# Patient Record
Sex: FEMALE | Race: WHITE | NOT HISPANIC OR LATINO | URBAN - METROPOLITAN AREA
[De-identification: names, ages, dates, MRNs, and addresses within clinical notes are randomized per-mention and may not be internally consistent; named-entity substitution may affect disease eponyms.]

---

## 2023-10-28 DIAGNOSIS — K50.10 CROHN'S DISEASE OF COLON WITHOUT COMPLICATION (MULTI): Primary | ICD-10-CM

## 2023-10-30 RX ORDER — LOPERAMIDE HYDROCHLORIDE 2 MG/1
CAPSULE ORAL
Qty: 240 CAPSULE | Refills: 3 | Status: SHIPPED | OUTPATIENT
Start: 2023-10-30 | End: 2024-03-07

## 2024-03-07 DIAGNOSIS — K50.10 CROHN'S DISEASE OF COLON WITHOUT COMPLICATION (MULTI): ICD-10-CM

## 2024-03-07 RX ORDER — LOPERAMIDE HYDROCHLORIDE 2 MG/1
CAPSULE ORAL
Qty: 240 CAPSULE | Refills: 3 | Status: SHIPPED | OUTPATIENT
Start: 2024-03-07

## 2024-04-18 PROBLEM — C21.0 ANAL SQUAMOUS CELL CARCINOMA (MULTI): Status: ACTIVE | Noted: 2024-04-18

## 2024-04-18 PROBLEM — K50.90 CROHN'S DISEASE (MULTI): Status: ACTIVE | Noted: 2024-04-18

## 2024-04-18 RX ORDER — CONJUGATED ESTROGENS 0.62 MG/G
CREAM VAGINAL
COMMUNITY
Start: 2024-01-22

## 2024-04-18 RX ORDER — FLUOXETINE HYDROCHLORIDE 20 MG/1
40 CAPSULE ORAL DAILY
COMMUNITY

## 2024-04-18 RX ORDER — LANSOPRAZOLE 30 MG/1
CAPSULE, DELAYED RELEASE ORAL
COMMUNITY
Start: 2024-01-26

## 2024-04-18 RX ORDER — ALBUTEROL SULFATE 90 UG/1
2 AEROSOL, METERED RESPIRATORY (INHALATION) EVERY 4 HOURS PRN
COMMUNITY
Start: 2024-02-12

## 2024-04-18 RX ORDER — ESTROGENS, CONJUGATED 0.62 MG/1
0.62 TABLET, FILM COATED ORAL DAILY
COMMUNITY
Start: 2024-02-08

## 2024-04-18 RX ORDER — VALACYCLOVIR HYDROCHLORIDE 500 MG/1
500 TABLET, FILM COATED ORAL DAILY
COMMUNITY
Start: 2024-01-21

## 2024-04-30 NOTE — PROGRESS NOTES
Brittany Kay is a 54 year old female with history of Crohn's disease and squamous cell carcinoma of the anus. S/P chemo and XRT finished summer 2012.      Brittany presents to the office for a check of the anal area.  Is under the care of Dr. Ramos Nino at [facility]. Last visit with pap was January 2024.  Was normal per patient.     Feels well.   She has good and bad days with her bowels.  Bowel habit is 3-10 times per day.  Stool consistency ranges from watery to semi firm.  She c/o seepage related to urgency.   Will have fecal incontinence when she has urgency and is not near a restroom.  She does use imodium and lomotil as needed.  No pain around the anus. No new lumps or bumps.  Weight has been going up.   She is gaining weight.      She found that the humira wasn't working very well.  She is able to control things well on pentasa and lomotil.          11/2023 Colonoscopy to ileocolic anastomosis   The perianal and digital rectal examinations were normal.   Patchy changes of vascular-pattern-decreased mucosa and mild luminal narrowing were found in the rectum. Biopsies were taken with a cold forceps for histology including from 20 cm where there was mild  luminal narrowing. We could not retroflex due to a stiff feeling   distal rectum.   A localized area of mildly vascular-pattern-decreased mucosa was found in the descending colon.   A diminutive polyp was found in the transverse colon. The polyp was sessile. The polyp was removed with a cold biopsy forceps. Resection and retrieval were complete.   There was evidence of a prior end-to-end ileo-colonic anastomosis in the distal ascending colon. This was patent and was characterized by healthy appearing mucosa. The anastomosis was traversed. The cristopher-TI was intubated for a distance of 10 cm and appeared normal   with no inflammation- this was graded as Rutgeerts Score i0 (no  lesions in the distal ileum).   Virtual chromoendoscopy using NBI was performed  throughout the colon-  no additional lesions were seen.  Biopsies were taken with a cold forceps in the entire colon for  histology.   Pathology  A. Colon, transverse, polypectomy:   - Minute focus of polypoid low-grade dysplasia.  - Adjacent colonic mucosa with no significant diagnostic alterations.   See comment.        B. Colon, transverse, biopsy:   - Colonic mucosa with no significant diagnostic alterations.   - No active disease present.   - Negative for dysplasia.      C. Colon, descending, biopsies:   - Colonic mucosa with no significant diagnostic alterations.   - No active disease present.   - Negative for dysplasia.      D. Colon at 20 cm, biopsy:   - Chronic quiescent colitis.    - Negative for dysplasia.      E. Colon, distal rectum, biopsy:  - Chronic quiescent colitis.    - Negative for dysplasia.         Visit Vitals  /75   Pulse 94   Temp 35.8 °C (96.4 °F)   Wt 61.2 kg (135 lb)   SpO2 99%   BMI 24.69 kg/m²   Smoking Status Never   BSA 1.64 m²       Review of Systems  Constitutional: Negative for fever, chills, anorexia, weight loss, malaise                  ENMT: Negative for nasal discharge, congestion, ear pain, mouth pain, throat pain             Respiratory: Negative for cough, hemoptysis, wheezing, shortness of breath         Cardiac: Negative for chest pain, dyspnea on exertion, orthopnea, palpitations, syncope                   Gastrointestinal: Negative for nausea, vomiting, diarrhea, constipation, abdominal pain, (+)CROHN'S DISEASE, (+)ANAL SQUAMOUS CELL CARCINOMA (+) INCONTINENCE         Genitourinary: Negative for discharge, dysuria, flank pain, frequency, hematuria                  Musculoskeletal: Negative for decreased ROM, pain, swelling, weakness              Neurological: Negative for dizziness, confusion, headache, seizures, syncope       Psychiatric: Negative for mood changes, anxiety, hallucinations, sleep changes, suicidal ideas         Skin: Negative for mass, pain,  itching, rash, ulcer (+) ANAL IRRITATION               Endocrine: Negative for heat intolerance, cold intolerance, excessive sweating, polyuria, excess thirst            Hematologic/Lymph: Negative for anemia, bruising, easy bleeding, night sweats, petechiae, history of DVT/PE or cancer              Allergic/Immunologic: Negative for anaphylaxis, itchy/ teary eyes, itching, sneezing, swelling           Physical Exam  Constitutional: Well developed, awake/alert/oriented x3, no distress, alert and cooperative             Eyes: Sclera anicteric, no conjunctival inflammation, conjugate gaze    ENMT: mucous membranes moist, no apparent injury,            Head/Neck: Neck supple, no apparent injury, No JVD, trachea midline, no bruits              Respiratory/Thorax: Patent airways, CTAB, normal breath sounds with good chest expansion, thorax symmetric         Cardiovascular: Regular, rate and rhythm, no murmurs, normal S1 and S2         Gastrointestinal: Nondistended, soft, non-tender, no rebound tenderness or guarding, no masses palpable, no organomegaly, +BS, no bruits               Extremities: normal extremities, no cyanosis edema, contusions or wounds, 2+ femoral pulses B/L              Neurological: alert and oriented x3, normal strength, Normal gait          Lymphatic: No palpable inguinal lymphadenopathy   Psychological: Appropriate mood and behavior         Skin: Warm and dry, no lesions, no rashes                Anorectal: 3mm lesion int he right posterior position, and a 1x2 mm lesion in the left just next to the gluteal cleft - both removed see below    Anoscopy    Date/Time: 5/22/2024 10:58 AM    Performed by: Berenice Mason MD  Authorized by: Berenice Mason MD    Consent:     Consent obtained:  Verbal    Consent given by:  Patient    Risks, benefits, and alternatives were discussed: yes      Risks discussed:  Pain    Alternatives discussed:  No treatment  Universal protocol:     Procedure explained  and questions answered to patient or proxy's satisfaction: yes      Relevant documents present and verified: yes      Test results available: yes      Imaging studies available: no      Site/side marked: yes      Immediately prior to procedure, a time out was called: yes      Patient identity confirmed:  Verbally with patient  Post-procedure details:     Procedure completion:  Tolerated  Comments:      Procedure Note: With a lubricated, gloved index finger, I gently performed a 360 degree sweep of the rectum checking for anal sphincter tone, tenderness, nodules, and masses. Following gentle dilation with a digital rectal exam, I inserted the lubricated anoscope with the obturator completely inserted. The obturator was removed after fully inserting the anoscope. The pediatric anoscope was slowly withdrawn, and the rectal and anal mucosa examined over 360 degrees.  There is a scar in the anterior position that is well healed and mild mucosal inflammation Crohn's vs RT    There were 2 lesions noted in the perianal skin that were prepped using betadine and injected with 1cc of lido with epi and removed using an 11 blade and sent for pathology and silver nitrate applied with no bleeding.      Impression:  TI and colonic Crohn's disease= doing well on pentasa  T4 anal SCC - Complete response to chemo/XRT  - [facility] For 12 years.    2 small skin lesions today    Plan:    F/U on pathology if this is AIN then will switch her to 6 month follow up  If not then will be yearly   Continue care with Dr. Posada if symptoms return urged her to retry biologics with her extensive disease.

## 2024-05-01 ENCOUNTER — APPOINTMENT (OUTPATIENT)
Dept: SURGERY | Facility: CLINIC | Age: 54
End: 2024-05-01
Payer: MEDICARE

## 2024-05-22 ENCOUNTER — OFFICE VISIT (OUTPATIENT)
Dept: SURGERY | Facility: CLINIC | Age: 54
End: 2024-05-22
Payer: MEDICARE

## 2024-05-22 VITALS
TEMPERATURE: 96.4 F | SYSTOLIC BLOOD PRESSURE: 108 MMHG | HEART RATE: 94 BPM | OXYGEN SATURATION: 99 % | BODY MASS INDEX: 24.69 KG/M2 | DIASTOLIC BLOOD PRESSURE: 75 MMHG | WEIGHT: 135 LBS

## 2024-05-22 DIAGNOSIS — C21.0 ANAL SQUAMOUS CELL CARCINOMA (MULTI): Primary | ICD-10-CM

## 2024-05-22 PROCEDURE — 88313 SPECIAL STAINS GROUP 2: CPT | Mod: TC,AHULAB | Performed by: COLON & RECTAL SURGERY

## 2024-05-22 PROCEDURE — 46600 DIAGNOSTIC ANOSCOPY SPX: CPT | Performed by: COLON & RECTAL SURGERY

## 2024-05-22 PROCEDURE — 99213 OFFICE O/P EST LOW 20 MIN: CPT | Performed by: COLON & RECTAL SURGERY

## 2024-05-22 PROCEDURE — 1036F TOBACCO NON-USER: CPT | Performed by: COLON & RECTAL SURGERY

## 2024-05-22 PROCEDURE — 88305 TISSUE EXAM BY PATHOLOGIST: CPT | Performed by: PATHOLOGY

## 2024-05-22 PROCEDURE — 88313 SPECIAL STAINS GROUP 2: CPT | Mod: TC | Performed by: PATHOLOGY

## 2024-05-22 RX ORDER — DOXYCYCLINE HYCLATE 20 MG
20 TABLET ORAL 2 TIMES DAILY
COMMUNITY

## 2024-05-22 RX ORDER — CLINDAMYCIN PHOSPHATE 11.9 MG/ML
SOLUTION TOPICAL 2 TIMES DAILY
COMMUNITY

## 2024-05-22 RX ORDER — SILVER NITRATE 38.21; 12.74 MG/1; MG/1
STICK TOPICAL ONCE
Status: CANCELLED | OUTPATIENT
Start: 2024-05-22 | End: 2024-05-22

## 2024-05-22 RX ORDER — TOPIRAMATE 200 MG/1
200 TABLET ORAL 3 TIMES DAILY
COMMUNITY

## 2024-05-22 RX ORDER — SUMATRIPTAN 50 MG/1
50 TABLET, FILM COATED ORAL ONCE AS NEEDED
COMMUNITY

## 2024-05-22 RX ORDER — TRETINOIN 0.5 MG/G
CREAM TOPICAL NIGHTLY
COMMUNITY

## 2024-05-22 RX ORDER — MONTELUKAST SODIUM 4 MG/1
1 TABLET, CHEWABLE ORAL DAILY
COMMUNITY

## 2024-05-22 RX ORDER — DIPHENOXYLATE HYDROCHLORIDE AND ATROPINE SULFATE 2.5; .025 MG/1; MG/1
1 TABLET ORAL 4 TIMES DAILY PRN
COMMUNITY
End: 2024-05-29 | Stop reason: SDUPTHER

## 2024-05-22 RX ORDER — MESALAMINE 0.38 G/1
1.5 CAPSULE, EXTENDED RELEASE ORAL DAILY
COMMUNITY

## 2024-05-22 RX ORDER — LEVETIRACETAM 500 MG/1
500 TABLET ORAL 3 TIMES DAILY
COMMUNITY

## 2024-05-22 RX ORDER — ATORVASTATIN CALCIUM 10 MG/1
10 TABLET, FILM COATED ORAL DAILY
COMMUNITY

## 2024-05-22 RX ORDER — CYCLOBENZAPRINE HCL 10 MG
10 TABLET ORAL 3 TIMES DAILY PRN
COMMUNITY

## 2024-05-22 RX ORDER — LIDOCAINE HYDROCHLORIDE AND EPINEPHRINE 10; 10 MG/ML; UG/ML
10 INJECTION, SOLUTION INFILTRATION; PERINEURAL ONCE
Status: CANCELLED | OUTPATIENT
Start: 2024-05-22 | End: 2024-05-22

## 2024-05-22 RX ORDER — MONTELUKAST SODIUM 10 MG/1
10 TABLET ORAL NIGHTLY
COMMUNITY

## 2024-05-22 ASSESSMENT — ENCOUNTER SYMPTOMS: DEPRESSION: 0

## 2024-05-22 ASSESSMENT — PAIN SCALES - GENERAL: PAINLEVEL: 2

## 2024-05-28 ENCOUNTER — PATIENT MESSAGE (OUTPATIENT)
Dept: SURGERY | Facility: CLINIC | Age: 54
End: 2024-05-28
Payer: MEDICARE

## 2024-05-28 NOTE — TELEPHONE ENCOUNTER
From: Brittany Kay  To: Berenice Mason  Sent: 5/28/2024 2:53 PM EDT  Subject: Refills    I need a refill for the generic lomotil. For some reason I can’t figure out how to do it on the venu. Please send it to the [facility] listed on [address]. Also, probably need refills for the loperamide as well. Thanks!

## 2024-05-29 DIAGNOSIS — K50.90 CROHN'S DISEASE WITHOUT COMPLICATION, UNSPECIFIED GASTROINTESTINAL TRACT LOCATION (MULTI): ICD-10-CM

## 2024-05-30 RX ORDER — DIPHENOXYLATE HYDROCHLORIDE AND ATROPINE SULFATE 2.5; .025 MG/1; MG/1
1 TABLET ORAL 4 TIMES DAILY PRN
Qty: 120 TABLET | Refills: 5 | Status: SHIPPED | OUTPATIENT
Start: 2024-05-30

## 2024-05-30 RX ORDER — LOPERAMIDE HCL 2 MG
TABLET ORAL
Qty: 240 TABLET | Refills: 11 | Status: SHIPPED | OUTPATIENT
Start: 2024-05-30

## 2024-06-05 LAB
LABORATORY COMMENT REPORT: NORMAL
PATH REPORT.FINAL DX SPEC: NORMAL
PATH REPORT.GROSS SPEC: NORMAL
PATH REPORT.MICROSCOPIC SPEC OTHER STN: NORMAL
PATH REPORT.RELEVANT HX SPEC: NORMAL
PATH REPORT.TOTAL CANCER: NORMAL

## 2024-06-06 ENCOUNTER — TELEPHONE (OUTPATIENT)
Dept: SURGERY | Facility: CLINIC | Age: 54
End: 2024-06-06
Payer: MEDICARE

## 2024-06-06 NOTE — TELEPHONE ENCOUNTER
Called patient and advised that her pathology showed no dysplasia or cancer which is great news.  Recommendation is to follow up in one year.  Patient verbalized understanding and agrees.  Stefania Rodgers RN

## 2024-08-30 DIAGNOSIS — K50.919 CROHN'S DISEASE WITH COMPLICATION, UNSPECIFIED GASTROINTESTINAL TRACT LOCATION (MULTI): ICD-10-CM

## 2024-08-30 DIAGNOSIS — R19.7 DIARRHEA, UNSPECIFIED TYPE: Primary | ICD-10-CM

## 2024-08-30 DIAGNOSIS — C21.0 ANAL SQUAMOUS CELL CARCINOMA (MULTI): ICD-10-CM

## 2024-08-30 RX ORDER — LOPERAMIDE HCL 2 MG
2 TABLET ORAL 4 TIMES DAILY PRN
Qty: 360 TABLET | Refills: 3 | Status: SHIPPED | OUTPATIENT
Start: 2024-08-30

## 2025-06-01 NOTE — PROGRESS NOTES
Brittany Kay is a 55 year old female female with history of Crohn's disease and squamous cell carcinoma of the anus. S/P chemo and XRT finished summer 2012.      Brittany presents to the office for a check of the anal area.  Is under the care of Dr. Ramos Nino at [facility]. Last visit with pap was January 2024.  Was normal per patient.    She was doing well with Crohn's disease, and was having 4-5 BM's/day.  Then was diagnosed with rheumatoid arthritis and started on biologics.      She is having 1-2 BM's/day since starting humira and has used steroids 2 x for flares - she is very happy with this results.   Minimal fecal incontinence - much better than last time      11/2023 Colonoscopy to ileocolic anastomosis   The perianal and digital rectal examinations were normal.   Patchy changes of vascular-pattern-decreased mucosa and mild luminal narrowing were found in the rectum. Biopsies were taken with a cold forceps for histology including from 20 cm where there was mild  luminal narrowing. We could not retroflex due to a stiff feeling   distal rectum.   A localized area of mildly vascular-pattern-decreased mucosa was found in the descending colon.   A diminutive polyp was found in the transverse colon. The polyp was sessile. The polyp was removed with a cold biopsy forceps. Resection and retrieval were complete.   There was evidence of a prior end-to-end ileo-colonic anastomosis in the distal ascending colon. This was patent and was characterized by healthy appearing mucosa. The anastomosis was traversed. The cristopher-TI was intubated for a distance of 10 cm and appeared normal   with no inflammation- this was graded as Rutgeerts Score i0 (no  lesions in the distal ileum).   Virtual chromoendoscopy using NBI was performed throughout the colon-  no additional lesions were seen.  Biopsies were taken with a cold forceps in the entire colon for  histology.   Pathology  A. Colon, transverse, polypectomy:   - Minute  focus of polypoid low-grade dysplasia.  - Adjacent colonic mucosa with no significant diagnostic alterations.   See comment.        B. Colon, transverse, biopsy:   - Colonic mucosa with no significant diagnostic alterations.   - No active disease present.   - Negative for dysplasia.      C. Colon, descending, biopsies:   - Colonic mucosa with no significant diagnostic alterations.   - No active disease present.   - Negative for dysplasia.      D. Colon at 20 cm, biopsy:   - Chronic quiescent colitis.    - Negative for dysplasia.      E. Colon, distal rectum, biopsy:  - Chronic quiescent colitis.    - Negative for dysplasia.       5/22/2024 Office visit:  There were 2 lesions noted in the perianal skin that were prepped using betadine and injected with 1cc of lido with epi and removed using an 11 blade and sent for pathology and silver nitrate applied with no bleeding.   Pathology  A. SKIN, ANUS, EXCISIONAL BIOPSY:  --VERRUCOUS EPIDERMAL HYPERPLASIA AND DERMAL FIBROSIS WITH MELANODERMA (TWO FRAGMENTS), SEE COMMENT   COMMENT:  The patient's history of Crohn's disease and squamous cell carcinoma of the anus s/p chemo and radiation therapy are noted.  The histologic findings could represent chronic irritation changes, or in the correct clinical context, a benign keratosis such as a verruca vulgaris, with treatment-related dermal changes.  No high-grade squamous dysplasia or malignancy are seen.        Review of Systems  Constitutional: Negative for fever, chills, anorexia, weight loss, malaise                  ENMT: Negative for nasal discharge, congestion, ear pain, mouth pain, throat pain             Respiratory: Negative for cough, hemoptysis, wheezing, shortness of breath         Cardiac: Negative for chest pain, dyspnea on exertion, orthopnea, palpitations, syncope                   Gastrointestinal: Negative for nausea, vomiting, diarrhea, constipation, abdominal pain, (+)CROHN'S DISEASE, (+)ANAL SQUAMOUS CELL  CARCINOMA (+) INCONTINENCE         Genitourinary: Negative for discharge, dysuria, flank pain, frequency, hematuria                  Musculoskeletal: Negative for decreased ROM, pain, swelling, weakness              Neurological: Negative for dizziness, confusion, headache, seizures, syncope       Psychiatric: Negative for mood changes, anxiety, hallucinations, sleep changes, suicidal ideas         Skin: Negative for mass, pain, itching, rash, ulcer (+) ANAL IRRITATION               Endocrine: Negative for heat intolerance, cold intolerance, excessive sweating, polyuria, excess thirst            Hematologic/Lymph: Negative for anemia, bruising, easy bleeding, night sweats, petechiae, history of DVT/PE or cancer              Allergic/Immunologic: Negative for anaphylaxis, itchy/ teary eyes, itching, sneezing, swelling             Physical Exam  Constitutional: Well developed, awake/alert/oriented x3, no distress, alert and cooperative             Eyes: Sclera anicteric, no conjunctival inflammation, conjugate gaze    ENMT: mucous membranes moist, no apparent injury,            Head/Neck: Neck supple, no apparent injury, No JVD, trachea midline, no bruits              Respiratory/Thorax: Patent airways, CTAB, normal breath sounds with good chest expansion, thorax symmetric         Cardiovascular: Regular, rate and rhythm, no murmurs, normal S1 and S2         Gastrointestinal: Nondistended, soft, non-tender, no rebound tenderness or guarding, no masses palpable, no organomegaly, +BS, no bruits, well healed scar without hernia        Extremities: normal extremities, no cyanosis edema, contusions or wounds, 2+ femoral pulses B/L              Neurological: alert and oriented x3, normal strength, Normal gait          Lymphatic: No palpable inguinal lymphadenopathy   Psychological: Appropriate mood and behavior         Skin: Warm and dry, no lesions, no rashes                Anorectal: Minor skin changes consistent with  67 radiation. Minor stricturing, smooth mucosa no lesions on skin or internally found.      Anoscopy    Date/Time: 6/11/2025 12:11 PM    Performed by: Berenice Mason MD  Authorized by: Berenice Mason MD    Consent:     Consent obtained:  Verbal    Consent given by:  Patient    Risks, benefits, and alternatives were discussed: yes      Risks discussed:  Pain    Alternatives discussed:  No treatment  Universal protocol:     Procedure explained and questions answered to patient or proxy's satisfaction: yes      Relevant documents present and verified: yes      Test results available: yes      Imaging studies available: no      Site/side marked: yes      Immediately prior to procedure, a time out was called: yes      Patient identity confirmed:  Verbally with patient  Post-procedure details:     Procedure completion:  Tolerated  Comments:      Procedure Note: With a lubricated, gloved index finger, I gently performed a 360 degree sweep of the rectum checking for anal sphincter tone, tenderness, nodules, and masses. Following gentle dilation with a digital rectal exam, I inserted the lubricated anoscope with the obturator completely inserted. The obturator was removed after fully inserting the anoscope. The anoscope was slowly withdrawn, and the rectal and anal mucosa examined over 360 degrees.     Well healed scar in the anterior position with linear scar in left lateral position.  Mucosa appears normal.  No concerning lesions.        Impression:  TI and colonic Crohn's disease= doing well on pentasa  T4 anal SCC - Complete response to chemo/XRT  - [facility] For 13 years.      Plan:    Yearly anoscopy   Colonoscopy in 1 year

## 2025-06-11 ENCOUNTER — OFFICE VISIT (OUTPATIENT)
Dept: SURGERY | Facility: CLINIC | Age: 55
End: 2025-06-11
Payer: MEDICARE

## 2025-06-11 VITALS
HEIGHT: 62 IN | DIASTOLIC BLOOD PRESSURE: 70 MMHG | WEIGHT: 130 LBS | BODY MASS INDEX: 23.92 KG/M2 | OXYGEN SATURATION: 97 % | HEART RATE: 90 BPM | TEMPERATURE: 97.2 F | SYSTOLIC BLOOD PRESSURE: 100 MMHG

## 2025-06-11 DIAGNOSIS — K50.90 CROHN'S DISEASE WITHOUT COMPLICATION, UNSPECIFIED GASTROINTESTINAL TRACT LOCATION: ICD-10-CM

## 2025-06-11 DIAGNOSIS — K50.10 CROHN'S DISEASE OF COLON WITHOUT COMPLICATION (MULTI): ICD-10-CM

## 2025-06-11 DIAGNOSIS — C21.0 ANAL SQUAMOUS CELL CARCINOMA: Primary | ICD-10-CM

## 2025-06-11 PROCEDURE — 46600 DIAGNOSTIC ANOSCOPY SPX: CPT | Performed by: COLON & RECTAL SURGERY

## 2025-06-11 PROCEDURE — 3008F BODY MASS INDEX DOCD: CPT | Performed by: COLON & RECTAL SURGERY

## 2025-06-11 PROCEDURE — 99213 OFFICE O/P EST LOW 20 MIN: CPT | Performed by: COLON & RECTAL SURGERY

## 2025-06-11 PROCEDURE — 1036F TOBACCO NON-USER: CPT | Performed by: COLON & RECTAL SURGERY

## 2025-06-11 RX ORDER — LOPERAMIDE HCL 2 MG
TABLET ORAL
Qty: 240 TABLET | Refills: 5 | Status: SHIPPED | OUTPATIENT
Start: 2025-06-11

## 2025-06-11 RX ORDER — DIPHENOXYLATE HYDROCHLORIDE AND ATROPINE SULFATE 2.5; .025 MG/1; MG/1
1 TABLET ORAL 4 TIMES DAILY PRN
Qty: 120 TABLET | Refills: 5 | Status: SHIPPED | OUTPATIENT
Start: 2025-06-11

## 2025-06-11 RX ORDER — ADALIMUMAB 40MG/0.4ML
KIT SUBCUTANEOUS
COMMUNITY
Start: 2025-05-22

## 2025-06-11 ASSESSMENT — PAIN SCALES - GENERAL: PAINLEVEL_OUTOF10: 0-NO PAIN
